# Patient Record
Sex: MALE | Race: WHITE | NOT HISPANIC OR LATINO | Employment: OTHER | URBAN - METROPOLITAN AREA
[De-identification: names, ages, dates, MRNs, and addresses within clinical notes are randomized per-mention and may not be internally consistent; named-entity substitution may affect disease eponyms.]

---

## 2017-12-04 ENCOUNTER — COMMUNICATION - HEALTHEAST (OUTPATIENT)
Dept: TELEHEALTH | Facility: CLINIC | Age: 72
End: 2017-12-04

## 2017-12-04 ENCOUNTER — OFFICE VISIT - HEALTHEAST (OUTPATIENT)
Dept: CARDIOLOGY | Facility: CLINIC | Age: 72
End: 2017-12-04

## 2017-12-04 DIAGNOSIS — I44.1 MOBITZ TYPE 1 SECOND DEGREE ATRIOVENTRICULAR BLOCK: ICD-10-CM

## 2017-12-04 DIAGNOSIS — R07.89 CHEST DISCOMFORT: ICD-10-CM

## 2017-12-04 ASSESSMENT — MIFFLIN-ST. JEOR: SCORE: 1579.47

## 2017-12-15 ENCOUNTER — HOSPITAL ENCOUNTER (OUTPATIENT)
Dept: CARDIOLOGY | Facility: HOSPITAL | Age: 72
Discharge: HOME | End: 2017-12-15
Attending: INTERNAL MEDICINE

## 2017-12-15 DIAGNOSIS — R07.89 CHEST DISCOMFORT: ICD-10-CM

## 2017-12-15 DIAGNOSIS — I44.1 MOBITZ TYPE 1 SECOND DEGREE ATRIOVENTRICULAR BLOCK: ICD-10-CM

## 2017-12-15 LAB
CV STRESS CURRENT BP HE: NORMAL
CV STRESS CURRENT HR HE: 100
CV STRESS CURRENT HR HE: 103
CV STRESS CURRENT HR HE: 103
CV STRESS CURRENT HR HE: 106
CV STRESS CURRENT HR HE: 108
CV STRESS CURRENT HR HE: 117
CV STRESS CURRENT HR HE: 120
CV STRESS CURRENT HR HE: 121
CV STRESS CURRENT HR HE: 124
CV STRESS CURRENT HR HE: 125
CV STRESS CURRENT HR HE: 125
CV STRESS CURRENT HR HE: 131
CV STRESS CURRENT HR HE: 140
CV STRESS CURRENT HR HE: 146
CV STRESS CURRENT HR HE: 146
CV STRESS CURRENT HR HE: 147
CV STRESS CURRENT HR HE: 147
CV STRESS CURRENT HR HE: 58
CV STRESS CURRENT HR HE: 65
CV STRESS CURRENT HR HE: 78
CV STRESS CURRENT HR HE: 86
CV STRESS CURRENT HR HE: 87
CV STRESS CURRENT HR HE: 87
CV STRESS CURRENT HR HE: 89
CV STRESS CURRENT HR HE: 93
CV STRESS CURRENT HR HE: 98
CV STRESS DEVIATION TIME HE: NORMAL
CV STRESS ECHO PERCENT HR HE: NORMAL
CV STRESS EXERCISE STAGE HE: NORMAL
CV STRESS FINAL RESTING BP HE: NORMAL
CV STRESS FINAL RESTING HR HE: 89
CV STRESS MAX HR HE: 147
CV STRESS MAX TREADMILL GRADE HE: 16
CV STRESS MAX TREADMILL SPEED HE: 4.2
CV STRESS PEAK DIA BP HE: NORMAL
CV STRESS PEAK SYS BP HE: NORMAL
CV STRESS PHASE HE: NORMAL
CV STRESS PROTOCOL HE: NORMAL
CV STRESS RESTING PT POSITION HE: NORMAL
CV STRESS RESTING PT POSITION HE: NORMAL
CV STRESS ST DEVIATION AMOUNT HE: NORMAL
CV STRESS ST DEVIATION ELEVATION HE: NORMAL
CV STRESS ST EVELATION AMOUNT HE: NORMAL
CV STRESS TEST TYPE HE: NORMAL
CV STRESS TOTAL STAGE TIME MIN 1 HE: NORMAL
ECHO EJECTION FRACTION ESTIMATED: 60 %
STRESS ECHO BASELINE BP: NORMAL
STRESS ECHO BASELINE HR: 59
STRESS ECHO CALCULATED PERCENT HR: 101 %
STRESS ECHO LAST STRESS BP: NORMAL
STRESS ECHO LAST STRESS HR: 147
STRESS ECHO POST ESTIMATED WORKLOAD: 12.1
STRESS ECHO POST EXERCISE DUR MIN: 10
STRESS ECHO POST EXERCISE DUR SEC: 56
STRESS ECHO TARGET HR: 124

## 2018-12-28 ENCOUNTER — COMMUNICATION - HEALTHEAST (OUTPATIENT)
Dept: ADMINISTRATIVE | Facility: CLINIC | Age: 73
End: 2018-12-28

## 2019-04-18 ENCOUNTER — OFFICE VISIT - HEALTHEAST (OUTPATIENT)
Dept: CARDIOLOGY | Facility: CLINIC | Age: 74
End: 2019-04-18

## 2019-04-18 DIAGNOSIS — I44.1 MOBITZ TYPE 1 SECOND DEGREE ATRIOVENTRICULAR BLOCK: ICD-10-CM

## 2019-04-18 DIAGNOSIS — I77.9 AORTA DISORDER (H): ICD-10-CM

## 2019-04-18 ASSESSMENT — MIFFLIN-ST. JEOR: SCORE: 1601.83

## 2019-04-22 ENCOUNTER — COMMUNICATION - HEALTHEAST (OUTPATIENT)
Dept: CARDIOLOGY | Facility: CLINIC | Age: 74
End: 2019-04-22

## 2020-01-09 ENCOUNTER — COMMUNICATION - HEALTHEAST (OUTPATIENT)
Dept: CARDIOLOGY | Facility: CLINIC | Age: 75
End: 2020-01-09

## 2020-01-13 ENCOUNTER — AMBULATORY - HEALTHEAST (OUTPATIENT)
Dept: CARDIOLOGY | Facility: CLINIC | Age: 75
End: 2020-01-13

## 2020-01-13 DIAGNOSIS — I48.21 PERMANENT ATRIAL FIBRILLATION (H): ICD-10-CM

## 2020-01-13 LAB
ATRIAL RATE - MUSE: 61 BPM
DIASTOLIC BLOOD PRESSURE - MUSE: NORMAL
INTERPRETATION ECG - MUSE: NORMAL
P AXIS - MUSE: 75 DEGREES
PR INTERVAL - MUSE: NORMAL
QRS DURATION - MUSE: 108 MS
QT - MUSE: 464 MS
QTC - MUSE: 382 MS
R AXIS - MUSE: 38 DEGREES
SYSTOLIC BLOOD PRESSURE - MUSE: NORMAL
T AXIS - MUSE: 38 DEGREES
VENTRICULAR RATE- MUSE: 41 BPM

## 2020-01-13 ASSESSMENT — MIFFLIN-ST. JEOR: SCORE: 1615.88

## 2020-01-14 ENCOUNTER — COMMUNICATION - HEALTHEAST (OUTPATIENT)
Dept: CARDIOLOGY | Facility: CLINIC | Age: 75
End: 2020-01-14

## 2020-01-15 ENCOUNTER — COMMUNICATION - HEALTHEAST (OUTPATIENT)
Dept: CARDIOLOGY | Facility: CLINIC | Age: 75
End: 2020-01-15

## 2020-01-16 ENCOUNTER — AMBULATORY - HEALTHEAST (OUTPATIENT)
Dept: CARDIOLOGY | Facility: CLINIC | Age: 75
End: 2020-01-16

## 2020-01-17 ENCOUNTER — COMMUNICATION - HEALTHEAST (OUTPATIENT)
Dept: CARDIOLOGY | Facility: CLINIC | Age: 75
End: 2020-01-17

## 2020-08-18 ENCOUNTER — COMMUNICATION - HEALTHEAST (OUTPATIENT)
Dept: CARDIOLOGY | Facility: CLINIC | Age: 75
End: 2020-08-18

## 2020-08-19 ENCOUNTER — OFFICE VISIT - HEALTHEAST (OUTPATIENT)
Dept: CARDIOLOGY | Facility: CLINIC | Age: 75
End: 2020-08-19

## 2020-08-19 DIAGNOSIS — I44.1 MOBITZ TYPE 1 SECOND DEGREE ATRIOVENTRICULAR BLOCK: ICD-10-CM

## 2020-08-19 ASSESSMENT — MIFFLIN-ST. JEOR: SCORE: 1586.85

## 2021-05-31 VITALS — BODY MASS INDEX: 24.38 KG/M2 | WEIGHT: 180 LBS | HEIGHT: 72 IN

## 2021-06-02 VITALS — WEIGHT: 184.9 LBS | HEIGHT: 72 IN | BODY MASS INDEX: 25.04 KG/M2

## 2021-06-04 VITALS
HEART RATE: 58 BPM | WEIGHT: 192.4 LBS | RESPIRATION RATE: 12 BRPM | BODY MASS INDEX: 26.94 KG/M2 | TEMPERATURE: 98 F | HEIGHT: 71 IN | DIASTOLIC BLOOD PRESSURE: 83 MMHG | SYSTOLIC BLOOD PRESSURE: 142 MMHG

## 2021-06-04 VITALS
HEIGHT: 71 IN | SYSTOLIC BLOOD PRESSURE: 126 MMHG | RESPIRATION RATE: 16 BRPM | DIASTOLIC BLOOD PRESSURE: 78 MMHG | WEIGHT: 186 LBS | HEART RATE: 48 BPM | BODY MASS INDEX: 26.04 KG/M2

## 2021-06-05 NOTE — TELEPHONE ENCOUNTER
----- Message from Christine Christopher sent at 1/17/2020  3:21 PM CST -----  General phone call:    Caller: Patient  Primary cardiologist: Dr. Hamilton  Detailed reason for call: Patient said that he did a research study through his apple watch and that there were changes in his heart rate. He wants to see if you need to see it before his appointment in March    Best phone number: [phone]  Best time to contact: any  Ok to leave a detailed message? yes  Device? no    Additional Info:         2 = assistive person

## 2021-06-05 NOTE — TELEPHONE ENCOUNTER
Zestar Study Consent Visit    Study description: ECG and PPG Study: Zestar Study      Meet Serra a 74 y.o. male , was contacted by today to follow-up on his report that he collected two Apple watch readings that showed A. Fib. One reading was collected at 0903 and the other was at 0922. He is in 2nd degree block. He did not experience any heart palpitations, shortness of breath or fatigue and denied any other symptoms.     He was instructed to monitor his heart rhythm and call us back if there are any further readings that show A. Fib. He understands.     Patient discussed with  BRAEDEN Taylor RN.    Hemalatha Mcclure RN

## 2021-06-05 NOTE — TELEPHONE ENCOUNTER
Zestar Study Consent Visit    Study description: ECG and PPG Study: Zestar Study      Meet Serra a 74 y.o. male , was contacted by today to discuss participation in the Zestar study.     The patient called the Clinical Trials Office to inquire about study participation.  The consent form was reviewed with the patient.     The review of the study included:    Study purpose     Conflict of interest    Device description      Study visits    Risks of participation    Benefits (if any)    Alternatives    Voluntary participation    Confidentiality     Compensation/costs of participation    Study stipends    Injury and legal rights    The subject was queried in regards to his willingness to continue and come in for scheduled appointment. his questions were answered to his satisfaction.   The patient has given his preliminary agreement to volunteer to participate in the above noted study.     Plan: Meet Serra will come to [facility] on 1/13/20 to continue consent process. If he continues to agrees to participate, the study visit will be done on the same day.  he was instructed to eat as usual before coming for study visit and take medications as usual too. he was encouraged to wear comfortable clothes and shoes to the study appointment.       Naty Cornelius RN

## 2021-06-05 NOTE — TELEPHONE ENCOUNTER
Call received from patient to main research line re: watch reporting rhythm as atrial fibrillation.  Confirmed with patient that he is not having any symptoms.  Reassured him that the watch may have issues accurately interpreting his heart rhythm (2 degree AV block).  Asked him to call back if he starts to feel any different.    He agrees with this plan.  Will follow up with him after watch recordings become available.    Maye Taylor RN, BSN  Clinical Trials Nurse

## 2021-06-05 NOTE — TELEPHONE ENCOUNTER
Return call to patient. Patient concerned because he viewed his 1/13/2020 EKG on [employer] and it notes changes. Reviewed with patient that 1/13/2020 EKG was compared to 8/12/98 EKG-- 22 years ago. Reassured patient that subsequent cardiac monitors and and monitoring during stress tests all consistently showed same heart rhythm documented in 1/13/2020 EKG. Patient verbalized understanding. He denies any changes in symptoms or functional capacity. Patient has follow-up scheduled in March. No further questions or concerns at this time. -ruby

## 2021-06-10 ENCOUNTER — OFFICE VISIT - HEALTHEAST (OUTPATIENT)
Dept: CARDIOLOGY | Facility: CLINIC | Age: 76
End: 2021-06-10

## 2021-06-10 DIAGNOSIS — I44.1 MOBITZ TYPE 1 SECOND DEGREE ATRIOVENTRICULAR BLOCK: ICD-10-CM

## 2021-06-10 NOTE — TELEPHONE ENCOUNTER
Wellness Screening Tool  Symptom Screening:  Do you have one of the following NEW symptoms:    Fever (subjective or >100.0)?  No    A new cough?  No    Shortness of breath?  No     Chills? No     New loss of taste or smell? No     Generalized body aches? No     New persistent headache? No     New sore throat? No     Nausea, vomiting, or diarrhea?  No    Within the past 3 weeks, have you been exposed to someone with a known positive illness below:    COVID-19 (known or suspected)?  No    Chicken pox?  No    Mealses?  No    Pertussis?  No    Patient notified of visitor policy- They may have one person accompany them to their appointment, but they will need to wear a mask and will be screened upon arrival for symptoms: Yes  Pt informed to wear a mask: Yes  Pt notified if they develop any symptoms listed above, prior to their appointment, they are to call the clinic directly at [phone] for further instructions.  Yes  Patient's appointment status: Patient will be seen in clinic as scheduled on Wed 8-19-20 @ 1310 in [facility] with Dr. Hamilton.

## 2021-06-25 NOTE — PROGRESS NOTES
Progress Notes by Celestino Hamilton MD at 12/4/2017  8:10 AM     Author: Celestino Hamilton MD Service: -- Author Type: Physician    Filed: 12/4/2017  8:34 AM Encounter Date: 12/4/2017 Status: Signed    : Celestino Hamilton MD (Physician)           Click to link to [facility] HEART CARE NOTE    Thank you, Dr. Mello, for asking us to see Meet Serra at the [facility].      Assessment/Recommendations   Patient with known Mobitz type I second-degree AV block who is stable at this time.  He has no symptoms of dizziness or lightheadedness.  He continues to walk a couple of miles without difficulty and maintains an active lifestyle.  He will be back to [employer] now that his Achilles heel is mended.    His left upper chest discomfort is atypical.  I would like to evaluate his chronotropic response to exercise as well as for potential myocardial ischemia given this discomfort and we will schedule a stress echocardiogram.    We will call him with the results and any further recommendations.  I have encouraged him to maintain an active lifestyle and will see him back in 1 year, but of course would be happy to see him sooner if questions or problems arise.         History of Present Illness    Mr. Meet Serra is a 72 y.o. male with Mobitz type I second-degree AV block.  He is had no significant bradycardia on Holter monitor last year and his echocardiogram showed normal left ventricular systolic function without significant valvular disease.  His abdominal aorta was normal caliber on ultrasound.    He is felt well this past year.  He is developed a tingling discomfort in his left upper chest and left shoulder which comes on randomly.  It will last 10-15 seconds and then go away.  It is not typically brought on with physical activity and does not radiate down his arm.  It is not associated with dyspnea.  It is not severe in nature but more mild to moderate.    He denies any  syncopal or near syncopal episode and also does not have orthopnea, paroxysmal nocturnal dyspnea, peripheral edema.  He denies palpitations.         Physical Examination Review of Systems   Vitals:    12/04/17 0816   BP: 126/78   Pulse: 64   Resp: 16     Body mass index is 24.41 kg/(m^2).  Wt Readings from Last 3 Encounters:   12/04/17 180 lb (81.6 kg)   12/20/16 178 lb (80.7 kg)   12/05/16 178 lb (80.7 kg)     General Appearance:   Alert, cooperative and in no acute distress.   ENT/Mouth: Oral mucuos membranes pink and moist .      EYES:  No scleral icterus. No Xanthelasma.    Neck: JVP normal. No Hepatojugular reflux. Thyroid not visualized   Chest/Lungs:   Lungs are clear to auscultation, equal chest wall expansion    Cardiovascular:   S1, S2 without murmur ,clicks or rubs. Brachial, radial and posterior tibial pulses are intact and symetric. No carotid bruits noted   Abdomen:  Nontender. BS+. No bruits.      Extremities: No cyanosis, clubbing or edema   Skin: no xanthelasma, warm.    Psych: Appropriate affect.   Neurologic: normal gait, normal  bilateral, no tremors        General: WNL  Eyes: WNL  Ears/Nose/Throat: WNL  Lungs: WNL  Heart: WNL  Stomach: WNL  Bladder: WNL  Muscle/Joints: WNL  Skin: WNL  Nervous System: WNL  Mental Health: WNL     Blood: WNL     Medical History  Surgical History Family History Social History   No past medical history on file. No past surgical history on file. Family History   Problem Relation Age of Onset   ? Cancer Mother    ? Cancer Father     Social History     Social History   ? Marital status:      Spouse name: N/A   ? Number of children: N/A   ? Years of education: N/A     Occupational History   ? Not on file.     Social History Main Topics   ? Smoking status: Never Smoker   ? Smokeless tobacco: Not on file   ? Alcohol use 1.2 oz/week     2 Cans of beer per week      Comment: Occassional   ? Drug use: No   ? Sexual activity: Not on file     Other Topics Concern   ?  Not on file     Social History Narrative          Medications  Allergies   Current Outpatient Prescriptions   Medication Sig Dispense Refill   ? acyclovir (ZOVIRAX) 400 MG tablet Take 400 mg by mouth 2 (two) times a day as needed.      ? acyclovir (ZOVIRAX) 5 % ointment Apply 5 application topically every 3 (three) hours.     ? aspirin 81 MG EC tablet Take 162 mg by mouth daily.     ? atorvastatin (LIPITOR) 20 MG tablet Take 20 mg by mouth bedtime.     ? coenzyme Q10 (CO Q-10) 100 mg capsule Take 100 mg by mouth daily.     ? cyanocobalamin 1000 MCG tablet Take 1,000 mcg by mouth daily.     ? fluticasone (FLOVENT DISKUS) 50 mcg/actuation diskus inhaler Inhale 2 puffs 2 (two) times a day.     ? odw-pkj-agp-blkbor-om 3,6,9 #5 (OMEGA 3-6-9 FATTY ACIDS) 400-400-200 mg cap Take 1 g by mouth daily.     ? loratadine (CLARITIN) 10 mg tablet Take 10 mg by mouth daily.     ? multivitamin with minerals (THERA-M) 9 mg iron-400 mcg Tab tablet Take 1 tablet by mouth daily.     ? triamcinolone (KENALOG) 0.1 % cream Apply 80 application topically 2 (two) times a day.     ? zolpidem (AMBIEN) 5 MG tablet Take 5 mg by mouth bedtime as needed for sleep.       No current facility-administered medications for this visit.       No Known Allergies      Lab Results    Chemistry/lipid CBC Cardiac Enzymes/BNP/TSH/INR   No results found for: CHOL, HDL, LDLCALC, TRIG, CREATININE, BUN, K, NA, CL, CO2 No results found for: WBC, HGB, HCT, MCV, PLT No results found for: CKTOTAL, CKMB, CKMBINDEX, TROPONINI, BNP, TSH, INR

## 2021-06-27 NOTE — PROGRESS NOTES
Progress Notes by Celestino Hamilton MD at 4/18/2019  3:30 PM     Author: Celestino Hamilton MD Service: -- Author Type: Physician    Filed: 4/18/2019  3:53 PM Encounter Date: 4/18/2019 Status: Signed    : Celestino Hamilton MD (Physician)           Click to link to [facility] HEART CARE NOTE    Thank you, Dr. Mello, for asking us to see Meet Serra at the [facility].      Assessment/Recommendations   Patient with Mobitz type I second-degree AV block but no functional limitations and no evidence for symptomatic bradycardia.  We will continue to follow his rhythm and I have told him to call if he has changes in his functional capacity, lightheaded spells of any type.    He also has some mild plaque in his abdominal aorta so he is on a statin and his LDL cholesterol approximately 1 year ago was 85.  I would recommend he continue this as well as aspirin each day.    Thank you for allowing us to participate in his care.         History of Present Illness    Mr. Meet Serra is a 73 y.o. male with known Mobitz type I second-degree heart block.  We have had him on the treadmill and his heart rate picks up significantly with physical activity has normal functional capacity.  He also had some minimal plaque in his aorta when we did a abdominal aortic ultrasound so he is on a statin and aspirin.  He has been feeling well this past year.  [place] [employer] [place] and has had no functional limitations.  He denies orthopnea, paroxysmal nocturnal dyspnea, peripheral edema, syncope or near syncopal episodes.  He does not have palpitations.  He is not had any chest discomfort.         Physical Examination Review of Systems   Vitals:    04/18/19 1504   BP: 136/80   Pulse: (!) 52   Resp: 16     Body mass index is 25.07 kg/m .  Wt Readings from Last 3 Encounters:   04/18/19 184 lb 14.4 oz (83.9 kg)   12/04/17 180 lb (81.6 kg)   12/20/16  178 lb (80.7 kg)     General Appearance:   Alert, cooperative and in no acute distress.   ENT/Mouth: Oral mucuos membranes pink and moist .      EYES:  No scleral icterus. No Xanthelasma.    Neck: JVP normal. No Hepatojugular reflux. Thyroid not visualized   Chest/Lungs:   Lungs are clear to auscultation, equal chest wall expansion    Cardiovascular:   S1, S2 without murmur ,clicks or rubs. Brachial, radial and posterior tibial pulses are intact and symetric. No carotid bruits noted   Abdomen:  Nontender. BS+.       Extremities: No cyanosis, clubbing or edema   Skin: no xanthelasma, warm.    Psych: Appropriate affect.   Neurologic: normal gait, normal  bilateral, no tremors        General: WNL  Eyes: WNL  Ears/Nose/Throat: WNL  Lungs: WNL  Heart: WNL  Stomach: WNL  Bladder: WNL  Muscle/Joints: WNL  Skin: WNL  Nervous System: WNL  Mental Health: WNL     Blood: WNL     Medical History  Surgical History Family History Social History   Past Medical History:   Diagnosis Date   ? High cholesterol    ? Mobitz type 2 second degree heart block    ? Sleep apnea, obstructive     CPAP    No past surgical history on file. Family History   Problem Relation Age of Onset   ? Cancer Mother    ? Cancer Father    ? Heart attack Paternal Grandfather     Social History     Socioeconomic History   ? Marital status:      Spouse name: Not on file   ? Number of children: Not on file   ? Years of education: Not on file   ? Highest education level: Not on file   Occupational History   ? Not on file   Social Needs   ? Financial resource strain: Not on file   ? Food insecurity:     Worry: Not on file     Inability: Not on file   ? Transportation needs:     Medical: Not on file     Non-medical: Not on file   Tobacco Use   ? Smoking status: Never Smoker   ? Smokeless tobacco: Never Used   Substance and Sexual Activity   ? Alcohol use: Yes     Alcohol/week: 1.2 oz     Types: 2 Cans of beer per week     Comment: Occassional   ? Drug use:  No   ? Sexual activity: Not on file   Lifestyle   ? Physical activity:     Days per week: Not on file     Minutes per session: Not on file   ? Stress: Not on file   Relationships   ? Social connections:     Talks on phone: Not on file     Gets together: Not on file     Attends [employer] service: Not on file     Active member of club or organization: Not on file     Attends meetings of clubs or organizations: Not on file     Relationship status: Not on file   ? Intimate partner violence:     Fear of current or ex partner: Not on file     Emotionally abused: Not on file     Physically abused: Not on file     Forced sexual activity: Not on file   Other Topics Concern   ? Not on file   Social History Narrative   ? Not on file          Medications  Allergies   Current Outpatient Medications   Medication Sig Dispense Refill   ? acyclovir (ZOVIRAX) 400 MG tablet Take 400 mg by mouth 2 (two) times a day as needed.      ? acyclovir (ZOVIRAX) 5 % ointment Apply 5 application topically every 3 (three) hours.     ? aspirin 81 MG EC tablet Take 162 mg by mouth daily.     ? atorvastatin (LIPITOR) 20 MG tablet Take 20 mg by mouth bedtime.     ? coenzyme Q10 (CO Q-10) 100 mg capsule Take 100 mg by mouth daily.     ? cyanocobalamin 1000 MCG tablet Take 1,000 mcg by mouth daily.     ? yyf-awi-thc-blkbor-om 3,6,9 #5 (OMEGA 3-6-9 FATTY ACIDS) 400-400-200 mg cap Take 1 g by mouth daily.     ? loratadine (CLARITIN) 10 mg tablet Take 10 mg by mouth daily.     ? multivitamin with minerals (THERA-M) 9 mg iron-400 mcg Tab tablet Take 1 tablet by mouth daily.     ? triamcinolone (KENALOG) 0.1 % cream Apply 80 application topically 2 (two) times a day.       No current facility-administered medications for this visit.       No Known Allergies      Lab Results    Chemistry/lipid CBC Cardiac Enzymes/BNP/TSH/INR   No results found for: CHOL, HDL, LDLCALC, TRIG, CREATININE, BUN, K, NA, CL, CO2 No results found for: WBC, HGB, HCT, MCV, PLT No  results found for: CKTOTAL, CKMB, CKMBINDEX, TROPONINI, BNP, TSH, INR

## 2021-06-28 ENCOUNTER — HOSPITAL ENCOUNTER (OUTPATIENT)
Dept: CARDIOLOGY | Facility: CLINIC | Age: 76
Discharge: HOME | End: 2021-06-28
Attending: INTERNAL MEDICINE
Payer: COMMERCIAL

## 2021-06-28 DIAGNOSIS — I44.1 MOBITZ TYPE 1 SECOND DEGREE ATRIOVENTRICULAR BLOCK: ICD-10-CM

## 2021-06-28 NOTE — PROGRESS NOTES
Progress Notes by Addy Arana at 1/13/2020  8:00 AM     Author: Addy Arana Service: -- Author Type: Patient Access    Filed: 1/13/2020  9:10 PM Encounter Date: 1/13/2020 Status: Signed    : Mat Hines MD (Physician)    Related Notes: Original Note by Addy Arana (Patient Access) filed at 1/13/2020 11:28 AM           [facility] Study Inclusion / Exclusion Criteria  Protocol Version 4.0 ([id number])    Inclusion Criteria    Yes No Criteria # Subject must meet all inclusion criteria:      [x]    []  1 At least 18 years old for NSR and 22 years old for Non-NSR. Inclusive for both cohorts, at time of screening, with no upper limit on age.        [x]      []  2 To be enrolled as a non-NSR subject the volunteer must have one of the following conditions: Permanent/Persistent AF, Hx of paroxysmal AF, Hx of High-rate AF, AF + rate control medication, Hx Atrial Flutter, PVC burden >1%, Frequent PACs, Hx BBB, Hx 2nd degree block (any type), Hx Bigeminy/Trigeminy/Quadgeminy, Hx Tachycardia. For subjects with any of the following diagnoses, the condition must be present at the time of screening:   ? Permanent/persistent AF  ? PVC [place]  ? Frequent PACs   Note: If not present at screening, subjects may not be enrolled as a non-NSR subject or NSR subject.         3  [x] N/A HE site For Subjects to be enrolled as NSR they must be in NSR at the time of screening as determined by the investigator. For subjects ?65 years old with PVC burden of ?1% or infrequent PACs (<3 ectopic beats in 30 seconds), they may qualify as individuals in the NSR cohort as long as they don't have a medical history/diagnosis of significant ectopic burden. For subjects ? 66 years old with PVC burden > 1% but ?10%, they may qualify as individuals in the NSR cohort as long as they don't have a medical history/diagnosis of significant ectopic burden.    [x]  []  4 Able to read and understand a written ICF    [x]  []  5 Willing and able to  participate in the study procedures and comply with its restrictions    [x]  []  6 Able to communicate effectively with study staff as well as understand and follow directions    All must be Yes    Exclusion Criteria-all must be no  Yes No Criteria # Subject must not meet any exclusion criteria:    []  [x]  1 Physical disability that prevents safe and adequate testing.   []  [x]  2 Pregnant women or women planning to become pregnant   []  [x]  3 Any acute illness or condition that may interfere with study procedures (e.g. cough, fever, sore throat, headache, sunburn, etc.)   []  [x]  4 Clinically significant hand tremors, as judged by the Investigator   []  [x]  5 Resting hypertension with systolic blood pressure ?161 mmHg or diastolic blood pressure ?101 mmHg (if at least 2 of 3 measurements meet this criteria)   []  [x]  6 Subjects with a pacemaker or an automated implantable cardioverter- defibrillator (AICD)   []  [x]  7 Acute myocardial infarction (MI) within 90 days from the screening visit   []  [x]  8 Other cardiovascular disease that increases the risk to the subject or would render the data uninterpretable in the opinion of the Investigator (e.g., recent or ongoing unstable angina, significant valvular heart disease or chronic heart failure, myocarditis or pericarditis)    []  [x]  9 Acute pulmonary embolism, pulmonary infarction, or deep vein thrombosis within 90 days from the screening visit    []  [x]  10 Stroke or transient ischemic attack within 90 days from the screening visit    []  [x]  11 Known untreated medical conditions as determined by the Investigator, such as but not limited to significant anemia, important electrolyte imbalance and untreated or uncontrolled thyroid disease.    []  [x]  12 Any history of wrist surgery with scarring in the area of the sensor location on the wrist where the subject will be wearing the watch;    []  [x]  13 Open wound(s) on the wrist and forearm where the  subject will be wearing the watch    []  [x]  14 Severe symptomatic (or active) overly dry/injured skin, skin disorders, or allergic skin reactions such as eczema, rosacea, impetigo, dermatomyositis or allergic contact dermatitis on wrist and locations where the electrodes will be placed (e.g. chest, forearms, stomach), as determined by the investigator.    []  [x]  15 Tattoos, scars or moles in the area of the sensor location on the wrist where the subject will be wearing the watch    []  [x]  16 Device wearing Wrist circumference ? 129 mm or ? 246 mm    []  [x]  17 Known significant sensitivity to medical adhesives or isopropyl alcohol (for ECG electrode placement)    []  [x]  18 Known allergy or sensitivity to fluorocarbon-based synthetic rubber, such as contact dermatitis with fluoroelastomer bands primarily used in wrist worn fitness devices    []  [x]  19 Subjects with any Medical History, Physical exam, vital sign or any other study procedure finding/assessment that in the opinion of the investigator could compromise subject safety during study participation or interfere with the study integrity and/or the accurate assessment of the study objectives    []  [x]  20 Subject works for a company that develops or sells medical and/or fitness devices (e.g., ECG monitors, wearable fitness bands, sleep monitors, etc.) or are technology journalists (e.g., professional bloggers, TV, magazine, newspaper reporters, etc.)    []  [x]  21 Weight > 181 kg for subjects using the stationary bike and/or treadmill. Weight of ?138 kg for NSR subjects.    []  [x]  22 Subject is employed in shift work, or otherwise does not maintain a reasonably consistent day/night schedule (e.g. Subjects who go to bed after 4am).    []  [x]  23 Overnight travel planned during data collection nights    []  [x]  24 Non-NSR subjects should not have partaken in strenuous physical activity within 12 hours prior to screening    []  [x]  25 Non-NSR  subjects with Atrial fibrillation categories: Subjects taking Class 1 or Class 3 antiarrhythmic agents such as the following may not take part in any stage of the study: amiodarone, sotalol, dronedarone, ibutilide, dofetilide, propafenone, quinidine, procainamide, disopyramide, flecainide (Subjects taking class 2, 4 or 5 antiarrhythmic agents may take part the study).    []  [x]  26 Subjects who have both a history of paroxysmal AF and a [place] skin type measurement of VI    []  [x]  27 Subjects who have missing index fingers on both hands      Subject has met all inclusion criteria and no exclusion criteria have been met.   Subject is ready to fully enrolled in the Zestar study.    Addy Arana

## 2021-06-28 NOTE — PROGRESS NOTES
Progress Notes by Behr-Holewinski, Sierra, RN at 1/16/2020  9:45 AM     Author: Behr-Holewinski, Sierra, RN Service: -- Author Type: Patient Access    Filed: 1/16/2020  9:45 AM Encounter Date: 1/16/2020 Status: Signed    : Behr-Holewinski, Sierra, RN (Registered Nurse)         Zestar Study Device Return    Meet Serra returned all the devices for the Zestar study.  Meet Serra denies medication changes or adverse events since last visit.    Meet Serra has now completed their participation in the Zestar study.   Thank you for your gift of participation.    Sierra Behr-Holewinski

## 2021-06-28 NOTE — PROGRESS NOTES
Progress Notes by Addy Arana at 1/13/2020  8:00 AM     Author: Addy Arana Service: -- Author Type: Patient Access    Filed: 1/13/2020 11:28 AM Encounter Date: 1/13/2020 Status: Signed    : Addy Arana (Patient Access)            Zestar Study Consent Visit    Study description: ECG and PPG Study: Zestar Study      Note time seated: 0807 seated before vitals at 0828    Meet Serra a 74 y.o. male , was seen in [facility] today to discuss participation in the Zestar study.   The consent discussion began on 1/10/2020.  Please refer to phone call note from Naty Cornelius for more details.  The consent form was reviewed with the patient.     The review of the study included:    Study purpose     Conflict of interest    Device description      Study visits    Risks of participation    Benefits (if any)    Alternatives    Voluntary participation    Confidentiality     Compensation/costs of participation    Study stipends    Injury and legal rights    The subject was provided time to review the consent form and consider participation. his questions were answered to his satisfaction.   The patient has voluntarily agreed to participate in the above noted study.     The consent form version 25 Nov. 2019 and HIPAA form version 11 June 2019 was signed 01/13/20 at 0824    The subject was provided with a copy of the consent form and HIPAA. A copy of the signed forms was forwarded to medical records.    No study procedures were done prior to Meet Serra providing informed consent.     Addy Arana    Subject Restrictions During Study -Confirmed with Subject prior to any study procedures completed    Restrictions on jewelry, recreational drugs, caffeine, and exercise few days prior and during study.   1. Subjects should not consume excessive amount of caffeine (6 or more 8-oz cups of coffee, or more than 570 mg of caffeine from energy drinks, pills or similar substance) during their participation in the study.   2.  "Subjects should not consume excessive amount of alcohol for the duration of their participation in the study. A typical moderate amount is allowed during stage 3.   3. Subjects should not take any recreational drugs (including, but not limited to methamphetamines, cocaine, opioids, cannabis, LSD) for the duration of their participation in the study.   4. Subjects should not wear underwire bra or jewelry during the in-lab study (to not interfere with electrode placement and ECG data recordings).   5. Subject will not be permitted to have their cell phone or any electronic recording device on or with them during the in-lab test session(s).   6. Subjects under 22 years old will not be permitted to take ECG recordings through the ECG patience on the wrist-worn devices.     For study stage 3 only   1. Subjects should only do high intensity exercise (e.g. sprinting, heavy lifting, etc.) in the morning upon awakening or else not at all   2. Subjects should abstain from swimming during the time of the study   3. Subjects should only shower in the morning upon awakening (or else not at all)   4. Female subjects are strongly suggested to wear non-underwire bras throughout this stage of the study     Addy Arana      Study Data collections   Vitals  (TPBP)     Vitals:    01/13/20 0833 01/13/20 0835 01/13/20 0836   BP: 146/75 136/78 142/83   Patient Site: Right Arm Right Arm Right Arm   Patient Position: Sitting Sitting Sitting   Cuff Size: Adult Regular Adult Regular Adult Regular   Pulse: (!) 55 (!) 54 (!) 58   Resp:   12   Temp:  98  F (36.7  C)    Weight:   192 lb 6.4 oz (87.3 kg)   Height:   5' 10.75\" (1.797 m)      VS taken after 5 min rest     MAP 1    104  MAP 2      100   MAP 3             107          Body mass index is 27.02 kg/m .  male  1945  74 y.o.      Note time patient placed in supine position: 0839    Ethnicity   []   or    [x]  Not  or   Race   []   or [place] " Native   []    []  Black or   []   or Other   [x]  White  Physical Activity Level  per subject report:   []  0- Extremely Inactive []  1- Sedentary []  2- Moderately Active  [x]  3- Vigorously Active []  4- Extremely Active  Trained Athlete    [] Yes  [x] No     Novak's' Skin type   [x] Type 1 [] Type 2 [] Type 3    [] Type 4 [] Type 5 [] Type 6    Subject participated in previous ECG study at [facility]: [] Yes    [x] No    Past Medical History:   Diagnosis Date   ? Aorta disorder (H) 2016   ? High cholesterol 1985   ? Mobitz type 1 second degree atrioventricular block 2016   ? Neuropathy 2016    b 12 deficency   ? Sleep apnea, obstructive 2016    CPAP       HISTORY OF HEART RHYTHM ABNORMALITIES (check only group subject is 'randomized[' to-only 1)  []  Group 1: History of paroxysmal atrial fibrillation (no excluded medications)  []  Group 2: History of paroxysmal atrial fibrillation (on excluded medications)    []  Group 3: History of high-rate atrial fibrillation   []  Group 4: History of atrial fibrillation with rate control medications    []  Group 5: Permanent/persistent atrial fibrillation    []  Group 6: history of atrial flutter  []  Group 7: Frequent PVCs  []  Group 8: Frequent PACs  []  Group 9: BBB (left or right)  [x]  Group 10: History of 2nd Heart Block (any type)  []  Group 11: History of bigeminy, trigeminy, and/or quadgeminy  []  Group 12: History of tachycardia     Special interest allergies: active allergic skin reactions  No Known Allergies    Current Outpatient Medications:   ?  acyclovir (ZOVIRAX) 400 MG tablet, Take 400 mg by mouth 2 (two) times a day as needed. , Disp: , Rfl:   ?  acyclovir (ZOVIRAX) 5 % ointment, Apply 5 application topically every 3 (three) hours., Disp: , Rfl:   ?  aspirin 81 MG EC tablet, Take 162 mg by mouth daily., Disp: , Rfl:   ?  atorvastatin (LIPITOR) 20 MG tablet, Take 20 mg by mouth bedtime., Disp: , Rfl:  "  ?  coenzyme Q10 (CO Q-10) 100 mg capsule, Take 100 mg by mouth daily., Disp: , Rfl:   ?  cyanocobalamin 1000 MCG tablet, Take 1,000 mcg by mouth daily., Disp: , Rfl:   ?  vhc-eoj-bih-blkbor-om 3,6,9 #5 (OMEGA 3-6-9 FATTY ACIDS) 400-400-200 mg cap, Take 1 g by mouth daily., Disp: , Rfl:   ?  loratadine (CLARITIN) 10 mg tablet, Take 10 mg by mouth daily., Disp: , Rfl:   ?  multivitamin with minerals (THERA-M) 9 mg iron-400 mcg Tab tablet, Take 1 tablet by mouth daily., Disp: , Rfl:   ?  naproxen sodium (ALEVE) 220 MG tablet, Take 220 mg by mouth 2 (two) times a day with meals., Disp: , Rfl:   ?  triamcinolone (KENALOG) 0.1 % cream, Apply 80 application topically 2 (two) times a day., Disp: , Rfl:       10-sec 12-lead ECG & 30-sec 12-lead ECG rhythm strip done; reviewed by & PE done by Santhosh Person  Subject Questionnaire    OCCUPATION: retired   Predominately works outdoors  [] Yes    [x] No       Hours/week spent outdoors (total, not only for work): 6    Frequently participates in \"hand intensive\" activities [] Yes [x] No  Caffeine   [x]  Never  [] Occasionally        []  Daily (1 cup/day)     []  Daily (>1 cup/day)    Alcohol   []  Never  [x] Light (drink or 2 occasionally) []  Moderate (a drink or 2 almost daily)   []  Occasional-heavy (more than a few drinks <2x / month)  [] Heavy (more than a few drinks >2x / month)    Tobacco/nicotine  [x]  Never  [] Rarely  []  Frequently/ Daily     Mattress Information    Mattress type:  [x]  Memory foam [] Gel  [] Innerspring (coil)  [] Airbed  [] Waterbed [] Shikibuton  [] Hybrid  [] No mattress  [] Other (comment):    Mattress foundation   [] Mattress on floor/ground    [] Mattress on foundation/box spring on floor/ground  [x] Mattress on foundation/box spring on bed frame  [] Mattress on tatami on floor/ground  [] Other (comment):    Mattress topper    [x] No mattress topper  [] Pillow top  [] Foam top - flat style  [] Foam top - \"egg crate\" style  [] Other " (comment):    Co-sleeper    [x]  Yes  []  No    CPAP use   [x] Yes  [] No      Dominant hand [] left  [x] right [] ambidextrous  Preferred Wrist to wear band on   [x]  left   []  right   Were screening day & study day: [x]  same [] different   Same: wrist circumference:      175   mm   Device wearing wrist skin fold thickness:       3.2  mm  Wrist Band Size:     [x]  Flush Fit S/M  []  Non-Flush Fit S/M   []  Flush Fit M/L  []  Non-Flush Fit M/L  []  Flush Fit XL  []  Non-Flush Fit XL    Preferred/natural band notch: 6  Secure band notch: 6  Crown orientation:  []  left   [x]  right  Device wearing wrist hairiness:     []  Light []  Medium       [x]  Heavy  Spectophotometer   L A B   Reading #1  59.16  8.45  18.65   Reading #2  57.55  9.40  18.50   Reading #3  60.61  9.26  17.92     Pregnancy test    [] WOCBP (age <55 yrs, no tubal ligation, no hysterectomy)    [x] n/a male or female not child bearing potential  For Stage 2: subject will use exercise bike for exercise portion of the study  Room Temperature: 22 C  CS Laptop ID: [id number]  CS Cam ID:[id number]  Device Set ID:[id number]  Wrist Device ID:[id number]  Subject has now completed their in-house participation in the Zestar study. Subject will complete Stage 3 at home for the next 3 days and return the equipment on 1/16/2020.  Addy Arana

## 2021-06-28 NOTE — PROGRESS NOTES
Progress Notes by Santhosh Person PA-C at 1/13/2020  8:00 AM     Author: Santhosh Person PA-C Service: -- Author Type: Physician Assistant    Filed: 1/13/2020 11:28 AM Encounter Date: 1/13/2020 Status: Signed    : Santhosh Person PA-C (Physician Assistant)        Zestar Study    Physical Examination  For abnormal findings, please evaluate if the finding is Clinically Significant (by 'CS') or Not Clinically Significant (by 'NCS')  General Appearance Normal  Head and Neck  Normal  Lungs    Normal  Cardiovascular  Abnormal- regularly irregular (NCS)  Abdomen   Normal  Musculoskeletal/Extremities Normal   Lymph Nodes  Normal  Skin    Normal  Neurological   Normal   Tremor absent     If present, evaluate severity on 1-10 scale    Santhosh Person PA-C

## 2021-06-29 NOTE — PROGRESS NOTES
Progress Notes by Celestino Hamilton MD at 8/19/2020  1:10 PM     Author: Celestino Hamilton MD Service: -- Author Type: Physician    Filed: 8/19/2020  1:44 PM Encounter Date: 8/19/2020 Status: Signed    : Celestino Hamilton MD (Physician)               Assessment/Recommendations   Patient with known Mobitz type I heart block who is continuing to be asymptomatic.  He plays pickle ball for 2 hours without difficulty.  He walks and is physically active in his yard without difficulties.  He has had no syncopal or near syncopal episodes.    We did talk about the possibility of getting an apple watch so he can monitor his heart rhythm and send us rhythm strips if needed and he will take that under advisement but I suspect he will do that.    I have encouraged him to maintain an active lifestyle and to call us if he has any symptoms with changes in his functional capacity, lightheadedness, near syncopal or syncopal episodes.    Thank you for allowing us to participate in his care       History of Present Illness/Subjective    Mr. Meet Serra is a 74 y.o. male with known Mobitz type I heart block but is been asymptomatic.  On a stress echocardiogram in 2017 he was able to get his heart rate up to 147 bpm.  He continues to play pickle ball several times a week, playing for 2 hours without difficulty.  He sweats but easily keeps up with others.  He has not had any lightheadedness, dizziness, syncopal or near syncopal episodes.  He does not feel palpitations generally but once in a while will get some flip-flopping.  He has not had any chest discomfort.  He denies peripheral edema.           Physical Examination Review of Systems   Vitals:    08/19/20 1309   BP: 126/78   Pulse: (!) 48   Resp: 16     Body mass index is 26.13 kg/m .  Wt Readings from Last 3 Encounters:   08/19/20 186 lb (84.4 kg)   01/13/20 192 lb 6.4 oz (87.3 kg)   04/18/19 184 lb 14.4 oz (83.9 kg)     General Appearance:   Alert, cooperative and in no  acute distress.   ENT/Mouth: Oral mucuos membranes pink and moist .      EYES:  no scleral icterus, normal conjunctivae   Neck: JVP normal. No Hepatojugular reflux. Thyroid not visualized.   Chest/Lungs:   Lungs are clear to auscultation, equal chest wall expansion.   Cardiovascular:   S1, S2 without murmur ,clicks or rubs. Brachial, radial and posterior tibial pulses are intact and symetric. No carotid bruits noted   Abdomen:  Nontender. BS+.   Extremities: No cyanosis, clubbing or edema   Skin: no xanthelasma, warm.    Neurologic:  Normal arm motion bilaterally, no tremors     Psychiatric: Appropriate affect.      General: WNL  Eyes: WNL  Ears/Nose/Throat: WNL  Lungs: WNL  Heart: WNL  Stomach: WNL  Bladder: WNL  Muscle/Joints: WNL  Skin: WNL  Nervous System: WNL  Mental Health: WNL     Blood: WNL       Medical History  Surgical History Family History Social History   Past Medical History:   Diagnosis Date   ? Aorta disorder (H) 2016   ? High cholesterol 1985   ? Mobitz type 1 second degree atrioventricular block 2016   ? Neuropathy 2016    b 12 deficency   ? Sleep apnea, obstructive 2016    CPAP    Past Surgical History:   Procedure Laterality Date   ? CATARACT EXTRACTION, BILATERAL  2019   ? HERNIA REPAIR  2005   ? ROTATOR CUFF REPAIR  2015    Right    Family History   Problem Relation Age of Onset   ? Cancer Mother    ? Cancer Father    ? Heart attack Paternal Grandfather     Social History     Socioeconomic History   ? Marital status:      Spouse name: Not on file   ? Number of children: Not on file   ? Years of education: Not on file   ? Highest education level: Not on file   Occupational History   ? Not on file   Social Needs   ? Financial resource strain: Not on file   ? Food insecurity     Worry: Not on file     Inability: Not on file   ? Transportation needs     Medical: Not on file     Non-medical: Not on file   Tobacco Use   ? Smoking status: Never Smoker   ? Smokeless tobacco: Never Used    Substance and Sexual Activity   ? Alcohol use: Yes     Alcohol/week: 2.0 standard drinks     Types: 2 Cans of beer per week     Comment: Occassional   ? Drug use: No   ? Sexual activity: Not on file   Lifestyle   ? Physical activity     Days per week: Not on file     Minutes per session: Not on file   ? Stress: Not on file   Relationships   ? Social connections     Talks on phone: Not on file     Gets together: Not on file     Attends Restoration service: Not on file     Active member of club or organization: Not on file     Attends meetings of clubs or organizations: Not on file     Relationship status: Not on file   ? Intimate partner violence     Fear of current or ex partner: Not on file     Emotionally abused: Not on file     Physically abused: Not on file     Forced sexual activity: Not on file   Other Topics Concern   ? Not on file   Social History Narrative   ? Not on file          Medications  Allergies   Current Outpatient Medications   Medication Sig Dispense Refill   ? acyclovir (ZOVIRAX) 400 MG tablet Take 400 mg by mouth 2 (two) times a day as needed.      ? acyclovir (ZOVIRAX) 5 % ointment Apply 5 application topically as needed.      ? aspirin 81 MG EC tablet Take 162 mg by mouth daily.     ? atorvastatin (LIPITOR) 20 MG tablet Take 20 mg by mouth every morning.      ? cyanocobalamin 1000 MCG tablet Take 1,000 mcg by mouth daily.     ? fvn-kfk-mpx-blkbor-om 3,6,9 #5 (OMEGA 3-6-9 FATTY ACIDS) 400-400-200 mg cap Take 1 g by mouth daily.     ? loratadine (CLARITIN) 10 mg tablet Take 10 mg by mouth daily.     ? multivitamin with minerals (THERA-M) 9 mg iron-400 mcg Tab tablet Take 1 tablet by mouth daily.     ? naproxen sodium (ALEVE) 220 MG tablet Take 220 mg by mouth as needed.      ? sildenafil (REVATIO) 20 mg tablet TAKE 1 5 TABLETS BY MOUTH DAILY AS NEEDED FOR OTHER (ERECTILE DYSFUNCTION) FOR UP TO 30 DOSES.     ? triamcinolone (KENALOG) 0.1 % cream Apply 80 application topically as needed.      ?  coenzyme Q10 (CO Q-10) 100 mg capsule Take 100 mg by mouth daily.       No current facility-administered medications for this visit.     No Known Allergies      Lab Results    Chemistry/lipid CBC Cardiac Enzymes/BNP/TSH/INR   No results found for: CHOL, HDL, LDLCALC, TRIG, CREATININE, BUN, K, NA, CL, CO2 No results found for: WBC, HGB, HCT, MCV, PLT No results found for: CKTOTAL, CKMB, CKMBINDEX, TROPONINI, BNP, TSH, INR

## 2021-07-01 ENCOUNTER — COMMUNICATION - HEALTHEAST (OUTPATIENT)
Dept: CARDIOLOGY | Facility: CLINIC | Age: 76
End: 2021-07-01

## 2021-07-04 NOTE — TELEPHONE ENCOUNTER
Telephone Encounter by Serena Lovett, RN at 7/1/2021  1:47 PM     Author: Serena Lovett RN Service: -- Author Type: Registered Nurse    Filed: 7/1/2021  2:01 PM Encounter Date: 7/1/2021 Status: Signed    : Serena Lovett RN (Registered Nurse)       Return call to patient - spoke to his wife Kajal who offered many questions about Holter results - read back Dr. Hamilton's response/recommendations to Kajal and attempted to address questions - offered to transf call to sched to arrange EP consult or f/u with Dr. Hamilton to further address question/concerns - Kajal stated they will call back to arrange appt and declined offer.  mg

## 2021-07-04 NOTE — PROGRESS NOTES
Progress Notes by Celestino Hamilton MD at 6/10/2021  8:00 AM     Author: Celestino Hamilton MD Service: -- Author Type: Physician    Filed: 6/10/2021  8:08 AM Encounter Date: 6/10/2021 Status: Signed    : Celestino Hamilton MD (Physician)               Assessment/Recommendations   Patient with known history of Mobitz type I AV block, who is asymptomatic.  Heart rate does get slow but he has no symptoms he continues to be physically active, playing pickle ball and working out and doing yard work without any limitations.    It has been sometime since we have evaluated his heart rate over 24.  Rosy will get Holter monitor.  He is agreeable and we will schedule that in the next month.    Thank you for allowing us to participate in his care.       History of Present Illness/Subjective    Mr. Meet Serra is a 75 y.o. male with known type I second-degree AV block.  He has been asymptomatic since my visit with him a year ago.  He has not had any chest discomfort, shortness of breath with activity, lightheadedness, syncope, or near syncopal episodes.  He does feel his heart skipped once in a while.  He continues to play pickle ball every other day and do a fair amount of yard work without any difficulty and do some other workout routines as well.  He feels like his functional capacity is quite stable.       Physical Examination Review of Systems   Vitals:    06/10/21 0749   BP: 124/62   Pulse: (!) 48   Resp: 12     Body mass index is 26.55 kg/m .  Wt Readings from Last 3 Encounters:   06/10/21 189 lb (85.7 kg)   08/19/20 186 lb (84.4 kg)   01/13/20 192 lb 6.4 oz (87.3 kg)     General Appearance:   Alert, cooperative and in no acute distress.   ENT/Mouth: Patient wearing a mask.      EYES:  no scleral icterus, normal conjunctivae   Neck: JVP normal. No Hepatojugular reflux. Thyroid not visualized.   Chest/Lungs:   Lungs are clear to auscultation, equal chest wall expansion.   Cardiovascular:   S1, S2 without  murmur ,clicks or rubs. Brachial, radial and posterior tibial pulses are intact and symetric. No carotid bruits noted   Abdomen:  Nontender. BS+.    Extremities: No cyanosis, clubbing or edema   Skin: no xanthelasma, warm.    Neurologic: normal arm movement bilateral, no tremors     Psychiatric: Appropriate affect.      General: WNL  Eyes: WNL  Ears/Nose/Throat: WNL  Lungs: WNL  Heart: WNL  Stomach: WNL  Bladder: WNL  Muscle/Joints: WNL  Skin: WNL  Nervous System: WNL  Mental Health: WNL     Blood: WNL       Medical History  Surgical History Family History Social History   Past Medical History:   Diagnosis Date   ? Aorta disorder (H) 2016   ? High cholesterol 1985   ? Mobitz type 1 second degree atrioventricular block 2016   ? Neuropathy 2016    b 12 deficency   ? Sleep apnea, obstructive 2016    CPAP    Past Surgical History:   Procedure Laterality Date   ? CATARACT EXTRACTION, BILATERAL  2019   ? HERNIA REPAIR  2005   ? ROTATOR CUFF REPAIR  2015    Right    Family History   Problem Relation Age of Onset   ? Cancer Mother    ? Cancer Father    ? Heart attack Paternal Grandfather     Social History     Socioeconomic History   ? Marital status:      Spouse name: Not on file   ? Number of children: Not on file   ? Years of education: Not on file   ? Highest education level: Not on file   Occupational History   ? Not on file   Social Needs   ? Financial resource strain: Not on file   ? Food insecurity     Worry: Not on file     Inability: Not on file   ? Transportation needs     Medical: Not on file     Non-medical: Not on file   Tobacco Use   ? Smoking status: Never Smoker   ? Smokeless tobacco: Never Used   Substance and Sexual Activity   ? Alcohol use: Yes     Alcohol/week: 2.0 standard drinks     Types: 2 Cans of beer per week     Comment: Occassional   ? Drug use: No   ? Sexual activity: Not on file   Lifestyle   ? Physical activity     Days per week: Not on file     Minutes per session: Not on file   ?  Stress: Not on file   Relationships   ? Social connections     Talks on phone: Not on file     Gets together: Not on file     Attends Episcopal service: Not on file     Active member of club or organization: Not on file     Attends meetings of clubs or organizations: Not on file     Relationship status: Not on file   ? Intimate partner violence     Fear of current or ex partner: Not on file     Emotionally abused: Not on file     Physically abused: Not on file     Forced sexual activity: Not on file   Other Topics Concern   ? Not on file   Social History Narrative   ? Not on file          Medications  Allergies   Current Outpatient Medications   Medication Sig Dispense Refill   ? acyclovir (ZOVIRAX) 400 MG tablet Take 400 mg by mouth 2 (two) times a day as needed.      ? acyclovir (ZOVIRAX) 5 % ointment Apply 5 application topically as needed.      ? aspirin 81 MG EC tablet Take 162 mg by mouth daily.     ? atorvastatin (LIPITOR) 20 MG tablet Take 20 mg by mouth every morning.      ? cyanocobalamin 1000 MCG tablet Take 1,000 mcg by mouth daily.     ? xax-kwl-qmq-blkbor-om 3,6,9 #5 (OMEGA 3-6-9 FATTY ACIDS) 400-400-200 mg cap Take 1 g by mouth daily.     ? loratadine (CLARITIN) 10 mg tablet Take 10 mg by mouth daily.     ? multivitamin with minerals (THERA-M) 9 mg iron-400 mcg Tab tablet Take 1 tablet by mouth daily.     ? naproxen sodium (ALEVE) 220 MG tablet Take 220 mg by mouth as needed.      ? sildenafil (REVATIO) 20 mg tablet TAKE 1 5 TABLETS BY MOUTH DAILY AS NEEDED FOR OTHER (ERECTILE DYSFUNCTION) FOR UP TO 30 DOSES.     ? triamcinolone (KENALOG) 0.1 % cream Apply 80 application topically as needed.        No current facility-administered medications for this visit.     No Known Allergies      Lab Results    Chemistry/lipid CBC Cardiac Enzymes/BNP/TSH/INR   No results found for: CHOL, HDL, LDLCALC, TRIG, CREATININE, BUN, K, NA, CL, CO2 No results found for: WBC, HGB, HCT, MCV, PLT No results found for:  CKTOTAL, CKMB, CKMBINDEX, TROPONINI, BNP, TSH, INR

## 2021-07-06 VITALS
WEIGHT: 189 LBS | RESPIRATION RATE: 12 BRPM | SYSTOLIC BLOOD PRESSURE: 124 MMHG | HEART RATE: 48 BPM | DIASTOLIC BLOOD PRESSURE: 62 MMHG | BODY MASS INDEX: 26.55 KG/M2

## 2021-07-19 ENCOUNTER — TELEPHONE (OUTPATIENT)
Dept: CARDIOLOGY | Facility: CLINIC | Age: 76
End: 2021-07-19

## 2021-07-19 NOTE — TELEPHONE ENCOUNTER
----- Message from Sarah Lopez sent at 7/19/2021  8:02 AM CDT -----  Regarding: PANTERA pt - was admitted over weekend  General phone call:    Caller: Meet  Primary cardiologist: PANTERA    Detailed reason for call: Pt states he was admitted at [facility] in [place] and they said to follow up with [facility] ASAP. Pt has appt with PANTERA on 7/29, PANTERA on vacation this week. Wanting to talk to someone to know what he should do moving forward     Best phone number: [phone]  Best time to contact: any  Ok to leave a detailed message? yes  Device? no    Additional Info:

## 2021-07-19 NOTE — TELEPHONE ENCOUNTER
Spoke with pt and had bad abd pain and went to ED. All test were negative. It was suggested he see Dr. Hamilton in follow up. Appointment 7/29. Pt wondering if he needs to be seen sooner. Suggested he see his PCP. Pt refused Rac at this time and will call if needed.

## 2021-07-27 ENCOUNTER — OFFICE VISIT (OUTPATIENT)
Dept: CARDIOLOGY | Facility: CLINIC | Age: 76
End: 2021-07-27
Payer: MEDICARE

## 2021-07-27 VITALS
BODY MASS INDEX: 25.98 KG/M2 | OXYGEN SATURATION: 97 % | RESPIRATION RATE: 16 BRPM | SYSTOLIC BLOOD PRESSURE: 140 MMHG | WEIGHT: 185 LBS | DIASTOLIC BLOOD PRESSURE: 68 MMHG | HEART RATE: 57 BPM

## 2021-07-27 DIAGNOSIS — I44.1 HEART BLOCK AV SECOND DEGREE: ICD-10-CM

## 2021-07-27 DIAGNOSIS — K80.20 GALLSTONES: ICD-10-CM

## 2021-07-27 DIAGNOSIS — Z95.0 CARDIAC PACEMAKER IN SITU: Primary | ICD-10-CM

## 2021-07-27 PROCEDURE — 99214 OFFICE O/P EST MOD 30 MIN: CPT | Performed by: INTERNAL MEDICINE

## 2021-07-27 RX ORDER — SILDENAFIL CITRATE 20 MG/1
20-100 TABLET ORAL PRN
COMMUNITY
Start: 2020-06-29

## 2021-07-27 RX ORDER — ATORVASTATIN CALCIUM 20 MG/1
10 TABLET, FILM COATED ORAL DAILY
COMMUNITY

## 2021-07-27 RX ORDER — NAPROXEN SODIUM 220 MG
220 TABLET ORAL PRN
COMMUNITY

## 2021-07-27 RX ORDER — TRIAMCINOLONE ACETONIDE 1 MG/G
80 CREAM TOPICAL PRN
COMMUNITY

## 2021-07-27 RX ORDER — HYDROXYZINE PAMOATE 25 MG/1
25 CAPSULE ORAL PRN
COMMUNITY
Start: 2021-07-25

## 2021-07-27 NOTE — LETTER
7/27/2021    MARIO MCCOY  [facility] [address]  [facility] [address]    RE: Meet Serra       Dear Colleague,    I had the pleasure of seeing Meet Serra in the [facility].            Assessment/Recommendations   Patient with a history of heart block, Mobitz type I who was recently admitted for gallbladder issues and noted to have more significant heart block and pacemaker was implanted at [facility].  Allergic reaction follow that and he does need his gallbladder out when his pacemaker heals.    He has a follow-up with the GI people and they will discuss the timing of cholecystectomy.    His pacemaker site looks like it is healing well and he will follow the instructions on physical activity that he was given from the [facility] cardiology program.    I offered to see him back in 1 year if helpful but also acknowledged that he is tied in with the device clinic in the [facility] system.    We will set up a follow-up for 1 year, but of course would be happy to see him sooner if questions or problems arise.    Thank you for allowing us to participate in his care.    30 minutes spent with chart review, patient visit, and documentation.       History of Present Illness/Subjective    Mr. Meet Serra is a 75 year old male with known Mobitz type I second-degree heart block.  He has been very active over the years and playing pickle ball and shooting trap and skeet without difficulty.  He has had no functional limitations until the last several weeks where he started to feel fatigued and based on previous discussions had set up an appointment to see me today to discuss the possibility of pacemaker.  He had an episode where he felt sweaty and lightheaded and had some symptoms in his chest and was evaluated in the [place] emergency department.  His EKG and lab work was unremarkable so he was sent home but had a recurrence and went to  [facility] where he was noted to have abnormal liver function tests and ultimately underwent ERCP and was felt to have passed some gallstones.  At the time he was being discharged they also noticed that he was in heart block which the patient described as type III which I believe suggest third-degree heart block.  He was asymptomatic at the time but was sitting down.  They offered a pacemaker which he accepted and they put the pacemaker in at [facility] and this was less than 1 week ago.  He is feeling well although did develop quite a significant allergic reaction to some type of medication and is currently on Benadryl and prednisone.    His breathing is comfortable.       Physical Examination Review of Systems   BP (!) 140/68 (BP Location: Right arm, Patient Position: Sitting, Cuff Size: Adult Regular)   Pulse 57   Resp 16   Wt 83.9 kg (185 lb)   SpO2 97%   BMI 25.98 kg/m    Body mass index is 25.98 kg/m .  Wt Readings from Last 3 Encounters:   07/27/21 83.9 kg (185 lb)   06/10/21 85.7 kg (189 lb)   08/19/20 84.4 kg (186 lb)     General Appearance:   Alert, cooperative and in no acute distress.   ENT/Mouth: Patient wearing a mask.      EYES:  no scleral icterus, normal conjunctivae   Neck: JVP normal. No Hepatojugular reflux. Thyroid not visualized.   Chest/Lungs:   Lungs are clear to auscultation, equal chest wall expansion.   Cardiovascular:   S1, S2 without murmur ,clicks or rubs. Brachial, radial and posterior tibial pulses are intact and symetric. No carotid bruits noted   Abdomen:  Nontender. BS+.    Extremities: No cyanosis, clubbing or edema   Skin: no xanthelasma, warm.    Neurologic: normal arm movement bilateral, no tremors     Psychiatric: Appropriate affect.      Enc Vitals  BP: (!) 140/68  Pulse: 57  Resp: 16  SpO2: 97 %  Weight: 83.9 kg (185 lb)                                           Medical History  Surgical History Family History Social History   Past Medical History:   Diagnosis Date      Aorta disorder (H) 2016     High cholesterol 1985     Hypercholesteremia      Mobitz type 1 second degree atrioventricular block 2016     Neuropathy 2016    b 12 deficency     Sleep apnea, obstructive 2016    CPAP    Past Surgical History:   Procedure Laterality Date     ARTHROSCOPY SHOULDER ROTATOR CUFF REPAIR  3/14/2012    Procedure:ARTHROSCOPY SHOULDER ROTATOR CUFF REPAIR; Right Shoulder Arthroscopic Rotator Cuff Repair, Subacromial Decompression, Bicep Tenotomy  ; Surgeon:HUGO FIGUEROA; [facility]     ARTHROSCOPY SHOULDER ROTATOR CUFF REPAIR  2015    Right     BLADDER SURGERY      polyp removal,      CATARACT EXTRACTION, BILATERAL  2019     deviated septum       HERNIA REPAIR       HERNIA REPAIR  2005    Family History   Problem Relation Age of Onset     Cancer Father      Genitourinary Problems Father      Cancer Mother      Respiratory Mother      Coronary Artery Disease Paternal Grandfather     Social History     Socioeconomic History     Marital status:      Spouse name: Not on file     Number of children: Not on file     Years of education: Not on file     Highest education level: Not on file   Occupational History     Not on file   Tobacco Use     Smoking status: Never Smoker     Smokeless tobacco: Never Used   Substance and Sexual Activity     Alcohol use: Yes     Alcohol/week: 2.0 standard drinks     Comment: Alcoholic Drinks/day: Occassional     Drug use: No     Sexual activity: Not on file   Other Topics Concern     Not on file   Social History Narrative     Not on file     Social Determinants of Health     Financial Resource Strain:      Difficulty of Paying Living Expenses:    Food Insecurity:      Worried About Running Out of Food in the Last Year:      Ran Out of Food in the Last Year:    Transportation Needs:      Lack of Transportation (Medical):      Lack of Transportation (Non-Medical):    Physical Activity:      Days of Exercise per Week:      Minutes of Exercise per  Session:    Stress:      Feeling of Stress :    Social Connections:      Frequency of Communication with Friends and Family:      Frequency of Social Gatherings with Friends and Family:      Attends Sikhism Services:      Active Member of Clubs or Organizations:      Attends Club or Organization Meetings:      Marital Status:    Intimate Partner Violence:      Fear of Current or Ex-Partner:      Emotionally Abused:      Physically Abused:      Sexually Abused:           Medications  Allergies   Current Outpatient Medications   Medication Sig Dispense Refill     acetaminophen (TYLENOL) 325 MG tablet Take 2 tablets by mouth every 4 hours as needed for other (mild pain). 100 tablet 0     acyclovir (ZOVIRAX) 400 MG tablet Take 400 mg by mouth 2 times daily as needed.       acyclovir (ZOVIRAX) 5 % ointment Apply 0.5 inches topically 6 times daily.       aspirin 81 MG tablet Take 81 mg by mouth daily        atorvastatin (LIPITOR) 20 MG tablet Take 10 mg by mouth daily       cyanocobalamin 1000 MCG TBCR Take 1,000 mcg by mouth daily        fish oil-omega-3 fatty acids (OMEGA 3) 1000 MG capsule Take 1 g by mouth daily.       hydrOXYzine (VISTARIL) 25 MG capsule Take 25 mg by mouth as needed       ibuprofen (ADVIL,MOTRIN) 200 MG tablet Take 200 mg by mouth every 4 hours as needed.       loratadine (CLARITIN) 10 MG tablet Take 10 mg by mouth daily as needed.       lovastatin (MEVACOR) 10 MG tablet Take 10 mg by mouth At Bedtime        Multiple Vitamin (MULTI-VITAMIN PO) Take 1 tablet by mouth.       naproxen sodium (ANAPROX) 220 MG tablet Take 220 mg by mouth as needed       sildenafil (REVATIO) 20 MG tablet Take  mg by mouth as needed       triamcinolone (KENALOG) 0.1 % external cream Apply 80 Application topically as needed      Allergies   Allergen Reactions     Mold          Lab Results    Chemistry/lipid CBC Cardiac Enzymes/BNP/TSH/INR   No results found for: CHOL, HDL, TRIG, CREATININE, BUN, NA, CO2 No results  found for: WBC, HGB, HCT, MCV, PLT No results found for: CKTOTAL, CKMB, TROPONINI, BNP, TSH, INR                                            Thank you for allowing me to participate in the care of your patient.      Sincerely,     Celestino Hamilton MD     [facility]  cc:   Aneudy Mello  [facility]  [address] [place] [address]

## 2021-07-27 NOTE — PROGRESS NOTES
Assessment/Recommendations   Patient with a history of heart block, Mobitz type I who was recently admitted for gallbladder issues and noted to have more significant heart block and pacemaker was implanted at [facility].  Allergic reaction follow that and he does need his gallbladder out when his pacemaker heals.    He has a follow-up with the GI people and they will discuss the timing of cholecystectomy.    His pacemaker site looks like it is healing well and he will follow the instructions on physical activity that he was given from the [facility] cardiology program.    I offered to see him back in 1 year if helpful but also acknowledged that he is tied in with the device clinic in the [facility] system.    We will set up a follow-up for 1 year, but of course would be happy to see him sooner if questions or problems arise.    Thank you for allowing us to participate in his care.    30 minutes spent with chart review, patient visit, and documentation.       History of Present Illness/Subjective    Mr. Meet Serra is a 75 year old male with known Mobitz type I second-degree heart block.  He has been very active over the years and playing pickle ball and shooting trap and skeet without difficulty.  He has had no functional limitations until the last several weeks where he started to feel fatigued and based on previous discussions had set up an appointment to see me today to discuss the possibility of pacemaker.  He had an episode where he felt sweaty and lightheaded and had some symptoms in his chest and was evaluated in the [place] emergency department.  His EKG and lab work was unremarkable so he was sent home but had a recurrence and went to [facility] where he was noted to have abnormal liver function tests and ultimately underwent ERCP and was felt to have passed some gallstones.  At the time he was being discharged they also noticed that he was in heart block which the patient described as type  III which I believe suggest third-degree heart block.  He was asymptomatic at the time but was sitting down.  They offered a pacemaker which he accepted and [facility] and [facility] was less than 1 week ago.  He is feeling well although did develop quite a significant allergic reaction to some type of medication and is currently on Benadryl and prednisone.    His breathing is comfortable.       Physical Examination Review of Systems   BP (!) 140/68 (BP Location: Right arm, Patient Position: Sitting, Cuff Size: Adult Regular)   Pulse 57   Resp 16   Wt 83.9 kg (185 lb)   SpO2 97%   BMI 25.98 kg/m    Body mass index is 25.98 kg/m .  Wt Readings from Last 3 Encounters:   07/27/21 83.9 kg (185 lb)   06/10/21 85.7 kg (189 lb)   08/19/20 84.4 kg (186 lb)     General Appearance:   Alert, cooperative and in no acute distress.   ENT/Mouth: Patient wearing a mask.      EYES:  no scleral icterus, normal conjunctivae   Neck: JVP normal. No Hepatojugular reflux. Thyroid not visualized.   Chest/Lungs:   Lungs are clear to auscultation, equal chest wall expansion.   Cardiovascular:   S1, S2 without murmur ,clicks or rubs. Brachial, radial and posterior tibial pulses are intact and symetric. No carotid bruits noted   Abdomen:  Nontender. BS+.    Extremities: No cyanosis, clubbing or edema   Skin: no xanthelasma, warm.    Neurologic: normal arm movement bilateral, no tremors     Psychiatric: Appropriate affect.      Enc Vitals  BP: (!) 140/68  Pulse: 57  Resp: 16  SpO2: 97 %  Weight: 83.9 kg (185 lb)                                           Medical History  Surgical History Family History Social History   Past Medical History:   Diagnosis Date     Aorta disorder (H) 2016     High cholesterol 1985     Hypercholesteremia      Mobitz type 1 second degree atrioventricular block 2016     Neuropathy 2016    b 12 deficency     Sleep apnea, obstructive 2016    CPAP    Past Surgical History:   Procedure Laterality  Date     ARTHROSCOPY SHOULDER ROTATOR CUFF REPAIR  3/14/2012    Procedure:ARTHROSCOPY SHOULDER ROTATOR CUFF REPAIR; Right Shoulder Arthroscopic Rotator Cuff Repair, Subacromial Decompression, Bicep Tenotomy  ; Surgeon:HUGO FIGUEROA; [facility]     ARTHROSCOPY SHOULDER ROTATOR CUFF REPAIR  2015    Right     BLADDER SURGERY      polyp removal,      CATARACT EXTRACTION, BILATERAL  2019     deviated septum       HERNIA REPAIR       HERNIA REPAIR  2005    Family History   Problem Relation Age of Onset     Cancer Father      Genitourinary Problems Father      Cancer Mother      Respiratory Mother      Coronary Artery Disease Paternal Grandfather     Social History     Socioeconomic History     Marital status:      Spouse name: Not on file     Number of children: Not on file     Years of education: Not on file     Highest education level: Not on file   Occupational History     Not on file   Tobacco Use     Smoking status: Never Smoker     Smokeless tobacco: Never Used   Substance and Sexual Activity     Alcohol use: Yes     Alcohol/week: 2.0 standard drinks     Comment: Alcoholic Drinks/day: Occassional     Drug use: No     Sexual activity: Not on file   Other Topics Concern     Not on file   Social History Narrative     Not on file     Social Determinants of Health     Financial Resource Strain:      Difficulty of Paying Living Expenses:    Food Insecurity:      Worried About Running Out of Food in the Last Year:      Ran Out of Food in the Last Year:    Transportation Needs:      Lack of Transportation (Medical):      Lack of Transportation (Non-Medical):    Physical Activity:      Days of Exercise per Week:      Minutes of Exercise per Session:    Stress:      Feeling of Stress :    Social Connections:      Frequency of Communication with Friends and Family:      Frequency of Social Gatherings with Friends and Family:      Attends Jainism Services:      Active Member of Clubs or Organizations:       Attends Club or Organization Meetings:      Marital Status:    Intimate Partner Violence:      Fear of Current or Ex-Partner:      Emotionally Abused:      Physically Abused:      Sexually Abused:           Medications  Allergies   Current Outpatient Medications   Medication Sig Dispense Refill     acetaminophen (TYLENOL) 325 MG tablet Take 2 tablets by mouth every 4 hours as needed for other (mild pain). 100 tablet 0     acyclovir (ZOVIRAX) 400 MG tablet Take 400 mg by mouth 2 times daily as needed.       acyclovir (ZOVIRAX) 5 % ointment Apply 0.5 inches topically 6 times daily.       aspirin 81 MG tablet Take 81 mg by mouth daily        atorvastatin (LIPITOR) 20 MG tablet Take 10 mg by mouth daily       cyanocobalamin 1000 MCG TBCR Take 1,000 mcg by mouth daily        fish oil-omega-3 fatty acids (OMEGA 3) 1000 MG capsule Take 1 g by mouth daily.       hydrOXYzine (VISTARIL) 25 MG capsule Take 25 mg by mouth as needed       ibuprofen (ADVIL,MOTRIN) 200 MG tablet Take 200 mg by mouth every 4 hours as needed.       loratadine (CLARITIN) 10 MG tablet Take 10 mg by mouth daily as needed.       lovastatin (MEVACOR) 10 MG tablet Take 10 mg by mouth At Bedtime        Multiple Vitamin (MULTI-VITAMIN PO) Take 1 tablet by mouth.       naproxen sodium (ANAPROX) 220 MG tablet Take 220 mg by mouth as needed       sildenafil (REVATIO) 20 MG tablet Take  mg by mouth as needed       triamcinolone (KENALOG) 0.1 % external cream Apply 80 Application topically as needed      Allergies   Allergen Reactions     Mold          Lab Results    Chemistry/lipid CBC Cardiac Enzymes/BNP/TSH/INR   No results found for: CHOL, HDL, TRIG, CREATININE, BUN, NA, CO2 No results found for: WBC, HGB, HCT, MCV, PLT No results found for: CKTOTAL, CKMB, TROPONINI, BNP, TSH, INR

## 2021-07-31 ENCOUNTER — HEALTH MAINTENANCE LETTER (OUTPATIENT)
Age: 76
End: 2021-07-31

## 2021-09-25 ENCOUNTER — HEALTH MAINTENANCE LETTER (OUTPATIENT)
Age: 76
End: 2021-09-25

## 2022-08-21 ENCOUNTER — HEALTH MAINTENANCE LETTER (OUTPATIENT)
Age: 77
End: 2022-08-21

## 2022-12-26 ENCOUNTER — HEALTH MAINTENANCE LETTER (OUTPATIENT)
Age: 77
End: 2022-12-26

## 2023-09-17 ENCOUNTER — HEALTH MAINTENANCE LETTER (OUTPATIENT)
Age: 78
End: 2023-09-17

## 2023-10-02 ENCOUNTER — ANCILLARY ORDERS (OUTPATIENT)
Dept: RADIOLOGY | Facility: CLINIC | Age: 78
End: 2023-10-02

## 2023-10-02 DIAGNOSIS — R13.10 DYSPHAGIA: Primary | ICD-10-CM

## 2023-10-10 ENCOUNTER — HOSPITAL ENCOUNTER (OUTPATIENT)
Dept: RADIOLOGY | Facility: CLINIC | Age: 78
Discharge: HOME | End: 2023-10-10
Attending: OTOLARYNGOLOGY | Admitting: OTOLARYNGOLOGY
Payer: MEDICARE

## 2023-10-10 DIAGNOSIS — R13.10 DYSPHAGIA: ICD-10-CM

## 2023-10-10 PROCEDURE — 74220 X-RAY XM ESOPHAGUS 1CNTRST: CPT

## 2024-11-10 ENCOUNTER — HEALTH MAINTENANCE LETTER (OUTPATIENT)
Age: 79
End: 2024-11-10